# Patient Record
Sex: FEMALE | URBAN - METROPOLITAN AREA
[De-identification: names, ages, dates, MRNs, and addresses within clinical notes are randomized per-mention and may not be internally consistent; named-entity substitution may affect disease eponyms.]

---

## 2019-09-12 ENCOUNTER — APPOINTMENT (RX ONLY)
Dept: URBAN - METROPOLITAN AREA CLINIC 51 | Facility: CLINIC | Age: 49
Setting detail: DERMATOLOGY
End: 2019-09-12

## 2019-09-12 DIAGNOSIS — L21.8 OTHER SEBORRHEIC DERMATITIS: ICD-10-CM

## 2019-09-12 DIAGNOSIS — D22 MELANOCYTIC NEVI: ICD-10-CM

## 2019-09-12 DIAGNOSIS — H01.13 ECZEMATOUS DERMATITIS OF EYELID: ICD-10-CM

## 2019-09-12 DIAGNOSIS — L63.8 OTHER ALOPECIA AREATA: ICD-10-CM

## 2019-09-12 DIAGNOSIS — B35.3 TINEA PEDIS: ICD-10-CM

## 2019-09-12 PROBLEM — D22.9 MELANOCYTIC NEVI, UNSPECIFIED: Status: ACTIVE | Noted: 2019-09-12

## 2019-09-12 PROBLEM — H01.139 ECZEMATOUS DERMATITIS OF UNSPECIFIED EYE, UNSPECIFIED EYELID: Status: ACTIVE | Noted: 2019-09-12

## 2019-09-12 PROCEDURE — 99203 OFFICE O/P NEW LOW 30 MIN: CPT | Mod: 25

## 2019-09-12 PROCEDURE — ? INTRALESIONAL KENALOG

## 2019-09-12 PROCEDURE — ? TREATMENT REGIMEN

## 2019-09-12 PROCEDURE — ? PRESCRIPTION

## 2019-09-12 PROCEDURE — ? COUNSELING

## 2019-09-12 PROCEDURE — 11901 INJECT SKIN LESIONS >7: CPT

## 2019-09-12 RX ORDER — KETOCONAZOLE 20.5 MG/ML
SHAMPOO, SUSPENSION TOPICAL QD
Qty: 1 | Refills: 3 | Status: ERX

## 2019-09-12 RX ORDER — KETOCONAZOLE 20.5 MG/ML
SHAMPOO, SUSPENSION TOPICAL
Qty: 1 | Refills: 2 | Status: ERX | COMMUNITY
Start: 2019-09-12

## 2019-09-12 RX ORDER — KETOCONAZOLE 20 MG/G
CREAM TOPICAL BID
Qty: 1 | Refills: 3 | Status: ERX | COMMUNITY
Start: 2019-09-12

## 2019-09-12 RX ADMIN — KETOCONAZOLE: 20 CREAM TOPICAL at 00:00

## 2019-09-12 RX ADMIN — KETOCONAZOLE: 20.5 SHAMPOO, SUSPENSION TOPICAL at 00:00

## 2019-09-12 ASSESSMENT — LOCATION DETAILED DESCRIPTION DERM
LOCATION DETAILED: LEFT SUPERIOR PARIETAL SCALP
LOCATION DETAILED: RIGHT SUPERIOR PARIETAL SCALP
LOCATION DETAILED: LEFT MEDIAL FRONTAL SCALP
LOCATION DETAILED: POSTERIOR MID-PARIETAL SCALP
LOCATION DETAILED: LEFT CENTRAL PARIETAL SCALP

## 2019-09-12 ASSESSMENT — LOCATION ZONE DERM: LOCATION ZONE: SCALP

## 2019-09-12 ASSESSMENT — LOCATION SIMPLE DESCRIPTION DERM
LOCATION SIMPLE: POSTERIOR SCALP
LOCATION SIMPLE: SCALP
LOCATION SIMPLE: LEFT SCALP

## 2019-09-12 NOTE — PROCEDURE: INTRALESIONAL KENALOG
Kenalog Preparation: Kenalog
Administered By (Optional): Zoey Bray
Include Z78.9 (Other Specified Conditions Influencing Health Status) As An Associated Diagnosis?: No
Medical Necessity Clause: This procedure was medically necessary because the lesions that were treated were:
Total Volume Injected (Ccs- Only Use Numbers And Decimals): 1.0
Consent: The risks of atrophy were reviewed with the patient.
Detail Level: Detailed
X Size Of Lesion In Cm (Optional): 0
Concentration Of Solution Injected (Mg/Ml): 3.0

## 2019-09-12 NOTE — PROCEDURE: MIPS QUALITY
Quality 110: Preventive Care And Screening: Influenza Immunization: Influenza immunization was not ordered or administered, reason not given
Quality 431: Preventive Care And Screening: Unhealthy Alcohol Use - Screening: Unhealthy alcohol use screening not performed, reason not otherwise specified
Quality 226: Preventive Care And Screening: Tobacco Use: Screening And Cessation Intervention: Tobacco Screening not Performed for Medical Reasons
Detail Level: Detailed

## 2019-10-01 ENCOUNTER — APPOINTMENT (RX ONLY)
Dept: URBAN - METROPOLITAN AREA CLINIC 51 | Facility: CLINIC | Age: 49
Setting detail: DERMATOLOGY
End: 2019-10-01

## 2019-10-01 DIAGNOSIS — D22 MELANOCYTIC NEVI: ICD-10-CM

## 2019-10-01 DIAGNOSIS — L63.8 OTHER ALOPECIA AREATA: ICD-10-CM

## 2019-10-01 PROBLEM — D22.9 MELANOCYTIC NEVI, UNSPECIFIED: Status: ACTIVE | Noted: 2019-10-01

## 2019-10-01 PROCEDURE — 99213 OFFICE O/P EST LOW 20 MIN: CPT | Mod: 25

## 2019-10-01 PROCEDURE — ? INTRALESIONAL KENALOG

## 2019-10-01 PROCEDURE — ? PRESCRIPTION

## 2019-10-01 PROCEDURE — 11901 INJECT SKIN LESIONS >7: CPT

## 2019-10-01 PROCEDURE — ? COUNSELING

## 2019-10-01 RX ORDER — FLUOCINONIDE 0.5 MG/ML
SOLUTION TOPICAL BID
Qty: 1 | Refills: 0 | Status: ERX | COMMUNITY
Start: 2019-10-01

## 2019-10-01 RX ADMIN — FLUOCINONIDE: 0.5 SOLUTION TOPICAL at 18:07

## 2019-10-01 ASSESSMENT — LOCATION ZONE DERM
LOCATION ZONE: SCALP
LOCATION ZONE: FACE

## 2019-10-01 ASSESSMENT — LOCATION DETAILED DESCRIPTION DERM
LOCATION DETAILED: RIGHT SUPERIOR FOREHEAD
LOCATION DETAILED: LEFT SUPERIOR PARIETAL SCALP
LOCATION DETAILED: RIGHT SUPERIOR PARIETAL SCALP
LOCATION DETAILED: RIGHT CENTRAL PARIETAL SCALP
LOCATION DETAILED: RIGHT SUPERIOR OCCIPITAL SCALP
LOCATION DETAILED: LEFT CENTRAL FRONTAL SCALP
LOCATION DETAILED: LEFT CENTRAL PARIETAL SCALP
LOCATION DETAILED: RIGHT CENTRAL FRONTAL SCALP

## 2019-10-01 ASSESSMENT — LOCATION SIMPLE DESCRIPTION DERM
LOCATION SIMPLE: RIGHT FOREHEAD
LOCATION SIMPLE: SCALP
LOCATION SIMPLE: LEFT SCALP
LOCATION SIMPLE: RIGHT OCCIPITAL SCALP
LOCATION SIMPLE: RIGHT SCALP

## 2019-10-01 NOTE — PROCEDURE: INTRALESIONAL KENALOG
Medical Necessity Clause: This procedure was medically necessary because the lesions that were treated were:
Administered By (Optional): Thaddeus OLIVAREZ
Detail Level: Detailed
Concentration Of Solution Injected (Mg/Ml): 3.0
Consent: The risks of atrophy were reviewed with the patient.
Kenalog Preparation: Kenalog
Total Volume Injected (Ccs- Only Use Numbers And Decimals): 2.0
X Size Of Lesion In Cm (Optional): 0
Include Z78.9 (Other Specified Conditions Influencing Health Status) As An Associated Diagnosis?: No
Ndc# For Kenalog Only: 0705-6212-90

## 2019-10-15 ENCOUNTER — APPOINTMENT (RX ONLY)
Dept: URBAN - METROPOLITAN AREA CLINIC 51 | Facility: CLINIC | Age: 49
Setting detail: DERMATOLOGY
End: 2019-10-15

## 2019-10-15 DIAGNOSIS — L63.8 OTHER ALOPECIA AREATA: ICD-10-CM

## 2019-10-15 DIAGNOSIS — D22 MELANOCYTIC NEVI: ICD-10-CM

## 2019-10-15 PROBLEM — D22.9 MELANOCYTIC NEVI, UNSPECIFIED: Status: ACTIVE | Noted: 2019-10-15

## 2019-10-15 PROCEDURE — ? COUNSELING

## 2019-10-15 PROCEDURE — ? INTRALESIONAL KENALOG

## 2019-10-15 PROCEDURE — 99213 OFFICE O/P EST LOW 20 MIN: CPT | Mod: 25

## 2019-10-15 PROCEDURE — 11900 INJECT SKIN LESIONS </W 7: CPT

## 2019-10-15 ASSESSMENT — LOCATION SIMPLE DESCRIPTION DERM: LOCATION SIMPLE: LEFT FOREHEAD

## 2019-10-15 ASSESSMENT — LOCATION ZONE DERM: LOCATION ZONE: FACE

## 2019-10-15 ASSESSMENT — LOCATION DETAILED DESCRIPTION DERM: LOCATION DETAILED: LEFT SUPERIOR MEDIAL FOREHEAD

## 2019-10-15 NOTE — PROCEDURE: MIPS QUALITY
Quality 110: Preventive Care And Screening: Influenza Immunization: Influenza immunization was not ordered or administered, reason not given
Quality 431: Preventive Care And Screening: Unhealthy Alcohol Use - Screening: Unhealthy alcohol use screening not performed, reason not otherwise specified
Detail Level: Detailed
Quality 130: Documentation Of Current Medications In The Medical Record: Current Medications Documented
Quality 226: Preventive Care And Screening: Tobacco Use: Screening And Cessation Intervention: Tobacco Screening not Performed for Medical Reasons

## 2019-10-15 NOTE — PROCEDURE: INTRALESIONAL KENALOG
Kenalog Preparation: Kenalog
Include Z78.9 (Other Specified Conditions Influencing Health Status) As An Associated Diagnosis?: No
Detail Level: Detailed
Total Volume Injected (Ccs- Only Use Numbers And Decimals): 2.0
Concentration Of Solution Injected (Mg/Ml): 3.0
Consent: The risks of atrophy were reviewed with the patient.
Administered By (Optional): [facility] JUANIS
Medical Necessity Clause: This procedure was medically necessary because the lesions that were treated were:
X Size Of Lesion In Cm (Optional): 0
Ndc# For Kenalog Only: 7982-9968-55

## 2019-10-29 ENCOUNTER — APPOINTMENT (RX ONLY)
Dept: URBAN - METROPOLITAN AREA CLINIC 51 | Facility: CLINIC | Age: 49
Setting detail: DERMATOLOGY
End: 2019-10-29

## 2019-10-29 DIAGNOSIS — L63.8 OTHER ALOPECIA AREATA: ICD-10-CM

## 2019-10-29 DIAGNOSIS — D22 MELANOCYTIC NEVI: ICD-10-CM

## 2019-10-29 PROBLEM — D22.9 MELANOCYTIC NEVI, UNSPECIFIED: Status: ACTIVE | Noted: 2019-10-29

## 2019-10-29 PROCEDURE — ? PRESCRIPTION

## 2019-10-29 PROCEDURE — 11901 INJECT SKIN LESIONS >7: CPT

## 2019-10-29 PROCEDURE — 99213 OFFICE O/P EST LOW 20 MIN: CPT | Mod: 25

## 2019-10-29 PROCEDURE — ? INTRALESIONAL KENALOG

## 2019-10-29 PROCEDURE — ? COUNSELING

## 2019-10-29 RX ORDER — FLUOCINONIDE 0.5 MG/ML
SOLUTION TOPICAL BID
Qty: 1 | Refills: 3 | Status: ACTIVE

## 2019-10-29 ASSESSMENT — LOCATION DETAILED DESCRIPTION DERM
LOCATION DETAILED: LEFT SUPERIOR PARIETAL SCALP
LOCATION DETAILED: POSTERIOR MID-PARIETAL SCALP
LOCATION DETAILED: LEFT CENTRAL FRONTAL SCALP
LOCATION DETAILED: RIGHT CENTRAL PARIETAL SCALP
LOCATION DETAILED: LEFT SUPERIOR FOREHEAD
LOCATION DETAILED: LEFT MEDIAL FRONTAL SCALP
LOCATION DETAILED: RIGHT SUPERIOR PARIETAL SCALP
LOCATION DETAILED: RIGHT SUPERIOR FOREHEAD
LOCATION DETAILED: RIGHT MEDIAL FRONTAL SCALP

## 2019-10-29 ASSESSMENT — LOCATION SIMPLE DESCRIPTION DERM
LOCATION SIMPLE: LEFT FOREHEAD
LOCATION SIMPLE: POSTERIOR SCALP
LOCATION SIMPLE: RIGHT SCALP
LOCATION SIMPLE: SCALP
LOCATION SIMPLE: LEFT SCALP
LOCATION SIMPLE: RIGHT FOREHEAD

## 2019-10-29 ASSESSMENT — LOCATION ZONE DERM
LOCATION ZONE: FACE
LOCATION ZONE: SCALP

## 2019-10-29 NOTE — PROCEDURE: INTRALESIONAL KENALOG
Medical Necessity Clause: This procedure was medically necessary because the lesions that were treated were:
X Size Of Lesion In Cm (Optional): 0
Total Volume Injected (Ccs- Only Use Numbers And Decimals): 2.0
Include Z78.9 (Other Specified Conditions Influencing Health Status) As An Associated Diagnosis?: No
Ndc# For Kenalog Only: 2266-5540-47
Consent: The risks of atrophy were reviewed with the patient.
Kenalog Preparation: Kenalog
Concentration Of Solution Injected (Mg/Ml): 3.0
Detail Level: Detailed
Administered By (Optional): Thaddeus OLIVAREZ

## 2019-11-12 ENCOUNTER — APPOINTMENT (RX ONLY)
Dept: URBAN - METROPOLITAN AREA CLINIC 51 | Facility: CLINIC | Age: 49
Setting detail: DERMATOLOGY
End: 2019-11-12

## 2019-11-12 DIAGNOSIS — L63.8 OTHER ALOPECIA AREATA: ICD-10-CM

## 2019-11-12 DIAGNOSIS — L21.8 OTHER SEBORRHEIC DERMATITIS: ICD-10-CM

## 2019-11-12 PROCEDURE — ? COUNSELING

## 2019-11-12 PROCEDURE — 11901 INJECT SKIN LESIONS >7: CPT

## 2019-11-12 PROCEDURE — ? PRESCRIPTION

## 2019-11-12 PROCEDURE — 99213 OFFICE O/P EST LOW 20 MIN: CPT | Mod: 25

## 2019-11-12 PROCEDURE — ? INTRALESIONAL KENALOG

## 2019-11-12 RX ORDER — KETOCONAZOLE 20.5 MG/ML
1 SHAMPOO, SUSPENSION TOPICAL 3 TIMES WEEKLY
Qty: 1 | Refills: 3 | Status: ERX

## 2019-11-12 RX ORDER — FLUOCINONIDE 0.5 MG/ML
1 SOLUTION TOPICAL BID
Qty: 1 | Refills: 3 | Status: ERX

## 2019-11-12 ASSESSMENT — LOCATION DETAILED DESCRIPTION DERM
LOCATION DETAILED: LEFT SUPERIOR OCCIPITAL SCALP
LOCATION DETAILED: LEFT CENTRAL FRONTAL SCALP
LOCATION DETAILED: POSTERIOR MID-PARIETAL SCALP
LOCATION DETAILED: RIGHT CENTRAL FRONTAL SCALP
LOCATION DETAILED: RIGHT MEDIAL FRONTAL SCALP
LOCATION DETAILED: RIGHT SUPERIOR PARIETAL SCALP
LOCATION DETAILED: RIGHT SUPERIOR OCCIPITAL SCALP
LOCATION DETAILED: RIGHT CENTRAL PARIETAL SCALP

## 2019-11-12 ASSESSMENT — LOCATION SIMPLE DESCRIPTION DERM
LOCATION SIMPLE: RIGHT SCALP
LOCATION SIMPLE: RIGHT OCCIPITAL SCALP
LOCATION SIMPLE: SCALP
LOCATION SIMPLE: POSTERIOR SCALP
LOCATION SIMPLE: LEFT SCALP
LOCATION SIMPLE: LEFT OCCIPITAL SCALP

## 2019-11-12 ASSESSMENT — LOCATION ZONE DERM: LOCATION ZONE: SCALP

## 2019-11-12 NOTE — PROCEDURE: INTRALESIONAL KENALOG
Treatment Number (Optional): 8
Total Volume Injected (Ccs- Only Use Numbers And Decimals): 2.0
Concentration Of Solution Injected (Mg/Ml): 3.0
Administered By (Optional): Thaddeus OLIVAREZ
Include Z78.9 (Other Specified Conditions Influencing Health Status) As An Associated Diagnosis?: No
Detail Level: Detailed
Medical Necessity Clause: This procedure was medically necessary because the lesions that were treated were:
Consent: The risks of atrophy were reviewed with the patient.
Ndc# For Kenalog Only: [id number]
Kenalog Preparation: Kenalog
X Size Of Lesion In Cm (Optional): 0

## 2019-12-19 ENCOUNTER — APPOINTMENT (RX ONLY)
Dept: URBAN - METROPOLITAN AREA CLINIC 51 | Facility: CLINIC | Age: 49
Setting detail: DERMATOLOGY
End: 2019-12-19

## 2019-12-19 DIAGNOSIS — L63.8 OTHER ALOPECIA AREATA: ICD-10-CM

## 2019-12-19 DIAGNOSIS — D22 MELANOCYTIC NEVI: ICD-10-CM

## 2019-12-19 PROBLEM — D22.9 MELANOCYTIC NEVI, UNSPECIFIED: Status: ACTIVE | Noted: 2019-12-19

## 2019-12-19 PROCEDURE — ? INTRALESIONAL KENALOG

## 2019-12-19 PROCEDURE — 11901 INJECT SKIN LESIONS >7: CPT

## 2019-12-19 PROCEDURE — ? COUNSELING

## 2019-12-19 PROCEDURE — 99213 OFFICE O/P EST LOW 20 MIN: CPT | Mod: 25

## 2019-12-19 ASSESSMENT — LOCATION SIMPLE DESCRIPTION DERM
LOCATION SIMPLE: RIGHT SCALP
LOCATION SIMPLE: SCALP
LOCATION SIMPLE: RIGHT FOREHEAD
LOCATION SIMPLE: LEFT FOREHEAD
LOCATION SIMPLE: LEFT SCALP

## 2019-12-19 ASSESSMENT — LOCATION DETAILED DESCRIPTION DERM
LOCATION DETAILED: RIGHT SUPERIOR FOREHEAD
LOCATION DETAILED: LEFT SUPERIOR MEDIAL FOREHEAD
LOCATION DETAILED: LEFT SUPERIOR FOREHEAD
LOCATION DETAILED: LEFT MEDIAL FRONTAL SCALP
LOCATION DETAILED: RIGHT CENTRAL FRONTAL SCALP
LOCATION DETAILED: RIGHT SUPERIOR PARIETAL SCALP
LOCATION DETAILED: RIGHT CENTRAL PARIETAL SCALP
LOCATION DETAILED: LEFT CENTRAL PARIETAL SCALP
LOCATION DETAILED: LEFT SUPERIOR PARIETAL SCALP

## 2019-12-19 ASSESSMENT — LOCATION ZONE DERM
LOCATION ZONE: FACE
LOCATION ZONE: SCALP

## 2019-12-19 NOTE — PROCEDURE: INTRALESIONAL KENALOG
Detail Level: Detailed
Kenalog Preparation: Kenalog
Include Z78.9 (Other Specified Conditions Influencing Health Status) As An Associated Diagnosis?: No
Total Volume Injected (Ccs- Only Use Numbers And Decimals): 2.0
Administered By (Optional): Thaddeus OLIVAREZ
X Size Of Lesion In Cm (Optional): 0
Medical Necessity Clause: This procedure was medically necessary because the lesions that were treated were:
Ndc# For Kenalog Only: [id number]
Concentration Of Solution Injected (Mg/Ml): 3.0
Consent: The risks of atrophy were reviewed with the patient.

## 2019-12-27 ENCOUNTER — APPOINTMENT (RX ONLY)
Dept: URBAN - METROPOLITAN AREA CLINIC 51 | Facility: CLINIC | Age: 49
Setting detail: DERMATOLOGY
End: 2019-12-27

## 2019-12-27 DIAGNOSIS — Z41.9 ENCOUNTER FOR PROCEDURE FOR PURPOSES OTHER THAN REMEDYING HEALTH STATE, UNSPECIFIED: ICD-10-CM

## 2019-12-27 PROCEDURE — ? LASER HAIR REMOVAL

## 2019-12-27 NOTE — PROCEDURE: LASER HAIR REMOVAL
Cooling: DCD setting
Consent: Written consent obtained, risks reviewed including but not limited to crusting, scabbing, blistering, scarring, darker or lighter pigmentary change, paradoxical hair regrowth, incomplete removal of hair and infection.
Spot Size: 8 mm
Fluence (Will Not Render If 0): [address]
Cooling: chill tip
External Cooling Fan Speed: 0
Spot Size: 12 mm
Post-Care Instructions: I reviewed with the patient in detail post-care instructions. Patient should avoid sun for a minimum of 4 weeks before and after treatment.
Pulse Duration: auto
Number Of Prepaid Treatments (Will Not Render If 0): 8
Total Pulses: [address]
Treatment Number: 1
Total Pulses: [address] [address]
Render Post-Care In The Note: No
Spot Size: 10 mm
Pre-Procedure: Prior to proceeding the treatment areas were cleaned and all present put on their eye protection.
Fluence (Will Not Render If 0): [address] [facility] [place]
Detail Level: Detailed
Fluence (Will Not Render If 0): 15
Laser Type: Lumenis Splendor X
Anesthesia Type: 1% lidocaine with epinephrine
Topical Anesthesia Type: 9.6% lidocaine
Post-Procedure Care: Immediate endpoint: perifollicular erythema and edema. Hydrocortisone cream applied. Post care reviewed with patient.
Pulse Duration: 30 ms
Fluence (Will Not Render If 0): 21

## 2020-02-07 ENCOUNTER — APPOINTMENT (RX ONLY)
Dept: URBAN - METROPOLITAN AREA CLINIC 51 | Facility: CLINIC | Age: 50
Setting detail: DERMATOLOGY
End: 2020-02-07

## 2020-02-07 DIAGNOSIS — Z41.9 ENCOUNTER FOR PROCEDURE FOR PURPOSES OTHER THAN REMEDYING HEALTH STATE, UNSPECIFIED: ICD-10-CM

## 2020-02-07 PROCEDURE — ? LASER HAIR REMOVAL

## 2020-02-07 NOTE — PROCEDURE: LASER HAIR REMOVAL
Total Pulses: [address]
Spot Size: 12 mm
Number Of Prepaid Treatments (Will Not Render If 0): 8
Number Of Prepaid Treatments (Will Not Render If 0): 0
Consent: Written consent obtained, risks reviewed including but not limited to crusting, scabbing, blistering, scarring, darker or lighter pigmentary change, paradoxical hair regrowth, incomplete removal of hair and infection.
Pulse Duration: auto
Pre-Procedure: Prior to proceeding the treatment areas were cleaned and all present put on their eye protection.
Fluence (Will Not Render If 0): 15
Anesthesia Type: 1% lidocaine with epinephrine
Topical Anesthesia Type: 9.6% lidocaine
Pulse Duration: 30 ms
Post-Care Instructions: I reviewed with the patient in detail post-care instructions. Patient should avoid sun for a minimum of 4 weeks before and after treatment.
Cooling: chill tip
Fluence (Will Not Render If 0): 21
Spot Size: 10 mm
Laser Type: Lumenis Splendor X
Post-Procedure Care: Immediate endpoint: perifollicular erythema and edema. Hydrocortisone cream applied. Post care reviewed with patient.
Render Post-Care In The Note: No
Fluence (Will Not Render If 0): [address] [facility]
Detail Level: Detailed
Total Pulses: [address] [address]
Treatment Number: 2
Cooling: DCD setting
Spot Size: 8 mm
Fluence (Will Not Render If 0): [address]
Treatment Number: 1
Fluence (Will Not Render If 0): 26
Total Pulses: 18
Total Pulses (Settings #3): 548
Fluence (Will Not Render If 0): 20

## 2020-02-07 NOTE — HPI: COSMETIC (LASER HAIR REMOVAL)
Have You Had Laser Hair Removal Before?: has had previous treatment
When Was Your Last Laser Treatment?: 12/27/2019
Number Of Treatments: 1
Have You Had Laser Hair Removal Before?: has not had previous treatment

## 2020-03-20 ENCOUNTER — APPOINTMENT (RX ONLY)
Dept: URBAN - METROPOLITAN AREA CLINIC 51 | Facility: CLINIC | Age: 50
Setting detail: DERMATOLOGY
End: 2020-03-20

## 2020-03-20 DIAGNOSIS — Z41.9 ENCOUNTER FOR PROCEDURE FOR PURPOSES OTHER THAN REMEDYING HEALTH STATE, UNSPECIFIED: ICD-10-CM

## 2020-03-20 PROCEDURE — ? LASER HAIR REMOVAL

## 2020-03-20 NOTE — PROCEDURE: LASER HAIR REMOVAL
Number Of Prepaid Treatments (Will Not Render If 0): 0
Number Of Prepaid Treatments (Will Not Render If 0): 8
Render Post-Care In The Note: No
Spot Size: 12 mm
Fluence (Will Not Render If 0): [address] [facility]
Fluence (Will Not Render If 0): [address]
Pulse Duration: auto
Cooling: DCD setting
Spot Size: 8 mm
Anesthesia Type: 1% lidocaine with epinephrine
Pre-Procedure: Prior to proceeding the treatment areas were cleaned and all present put on their eye protection.
Post-Procedure Care: Immediate endpoint: perifollicular erythema and edema. Hydrocortisone cream applied. Post care reviewed with patient.
Fluence (Will Not Render If 0): 15
Laser Type: Lumenis Splendor X
Detail Level: Detailed
Spot Size: 10 mm
Fluence (Will Not Render If 0): 21
Total Pulses: [address]
Pulse Duration: 30 ms
Consent: Written consent obtained, risks reviewed including but not limited to crusting, scabbing, blistering, scarring, darker or lighter pigmentary change, paradoxical hair regrowth, incomplete removal of hair and infection.
Total Pulses: [place]
Cooling: chill tip
Post-Care Instructions: I reviewed with the patient in detail post-care instructions. Patient should avoid sun for a minimum of 4 weeks before and after treatment.
Topical Anesthesia Type: 9.6% lidocaine
Treatment Number: 3
Total Pulses (Settings #3): 548
Fluence (Will Not Render If 0): 26
Treatment Number: 2
Fluence (Will Not Render If 0): 20
Fluence (Will Not Render If 0): 6
Total Pulses: [address]t

## 2020-03-20 NOTE — HPI: COSMETIC (LASER HAIR REMOVAL)
Have You Had Laser Hair Removal Before?: has had previous treatment
When Was Your Last Laser Treatment?: 02/07/2020
Number Of Treatments: 2
Number Of Treatments: 1

## 2021-06-25 ENCOUNTER — APPOINTMENT (RX ONLY)
Dept: URBAN - METROPOLITAN AREA CLINIC 51 | Facility: CLINIC | Age: 51
Setting detail: DERMATOLOGY
End: 2021-06-25

## 2021-06-25 DIAGNOSIS — Z41.9 ENCOUNTER FOR PROCEDURE FOR PURPOSES OTHER THAN REMEDYING HEALTH STATE, UNSPECIFIED: ICD-10-CM

## 2021-06-25 PROCEDURE — ? LASER HAIR REMOVAL

## 2021-06-25 NOTE — PROCEDURE: LASER HAIR REMOVAL
Treatment Number: 0
Eye Shield Text: Given the treatment area eye shields were inserted prior to treatment.
Pulse Duration: auto
Anesthesia Type: 1% lidocaine with epinephrine
Pre-Procedure: Prior to proceeding the treatment areas were cleaned and all present put on their eye protection.
Pulse Duration: 30 ms
Consent: Written consent obtained, risks reviewed including but not limited to crusting, scabbing, blistering, scarring, darker or lighter pigmentary change, paradoxical hair regrowth, incomplete removal of hair and infection.
Total Pulses: [address]
Cooling: chill tip
Post-Procedure Care: Immediate endpoint: perifollicular erythema and edema. Hydrocortisone cream applied. Post care reviewed with patient.
Laser Type: Lumenis Splendor X
Spot Size: 10 mm
Post-Care Instructions: I reviewed with the patient in detail post-care instructions. Patient should avoid sun for a minimum of 4 weeks before and after treatment.
Treatment Number: 4
Fluence (Will Not Render If 0): [address] [facility]
Render Post-Care In The Note: No
Number Of Prepaid Treatments (Will Not Render If 0): 8
Cooling: DCD setting
Topical Anesthesia Type: 9.6% lidocaine
Spot Size: 12 mm
Total Pulses: [address]
Spot Size: 8 mm
Fluence (Will Not Render If 0): 21
Fluence (Will Not Render If 0): 15
Detail Level: Detailed
Fluence (Will Not Render If 0): [address]
Fluence (Will Not Render If 0): 20
Total Pulses: [address]
Fluence (Will Not Render If 0): 26
Total Pulses (Settings #3): 548
Treatment Number: 3
Fluence (Will Not Render If 0): 6

## 2021-06-25 NOTE — HPI: COSMETIC (LASER HAIR REMOVAL)
Have You Had Laser Hair Removal Before?: has had previous treatment
When Was Your Last Laser Treatment?: 05/20/2020
Number Of Treatments: 3
Number Of Treatments: 2

## 2021-07-27 ENCOUNTER — APPOINTMENT (RX ONLY)
Dept: URBAN - METROPOLITAN AREA CLINIC 51 | Facility: CLINIC | Age: 51
Setting detail: DERMATOLOGY
End: 2021-07-27

## 2021-07-27 DIAGNOSIS — Z41.9 ENCOUNTER FOR PROCEDURE FOR PURPOSES OTHER THAN REMEDYING HEALTH STATE, UNSPECIFIED: ICD-10-CM

## 2021-07-27 PROCEDURE — ? LASER HAIR REMOVAL

## 2021-07-27 NOTE — HPI: COSMETIC (LASER HAIR REMOVAL)
Have You Had Laser Hair Removal Before?: has had previous treatment
When Was Your Last Laser Treatment?: 06/25/2021
Number Of Treatments: 4
Number Of Treatments: 3

## 2021-07-27 NOTE — PROCEDURE: LASER HAIR REMOVAL
Detail Level: Detailed
Cooling: chill tip
Treatment Number: 5
Treatment Number: 0
Post-Care Instructions: I reviewed with the patient in detail post-care instructions. Patient should avoid sun for a minimum of 4 weeks before and after treatment.
Eye Shield Text: Given the treatment area eye shields were inserted prior to treatment.
Number Of Prepaid Treatments (Will Not Render If 0): 8
Fluence (Will Not Render If 0): [address] [facility]
Cooling: DCD setting
Render Post-Care In The Note: No
Spot Size: 8 mm
Pulse Duration: 30 ms
Fluence (Will Not Render If 0): 15
Spot Size: 10 mm
Topical Anesthesia Type: 9.6% lidocaine
Pulse Duration: auto
Total Pulses: [address] [facility]
Fluence (Will Not Render If 0): [address]
Spot Size: 12 mm
Pre-Procedure: Prior to proceeding the treatment areas were cleaned and all present put on their eye protection.
Anesthesia Type: 1% lidocaine with epinephrine
Fluence (Will Not Render If 0): 21
Total Pulses: [address]
Laser Type: Lumenis Splendor X
Consent: Written consent obtained, risks reviewed including but not limited to crusting, scabbing, blistering, scarring, darker or lighter pigmentary change, paradoxical hair regrowth, incomplete removal of hair and infection.
Post-Procedure Care: Immediate endpoint: perifollicular erythema and edema. Hydrocortisone cream applied. Post care reviewed with patient.
Fluence (Will Not Render If 0): 20
Total Pulses: [address]
Spot Size: 20 mm
Fluence (Will Not Render If 0): 26
Total Pulses (Settings #3): 548
Treatment Number: 4
Fluence (Will Not Render If 0): 6

## 2021-08-24 ENCOUNTER — APPOINTMENT (RX ONLY)
Dept: URBAN - METROPOLITAN AREA CLINIC 51 | Facility: CLINIC | Age: 51
Setting detail: DERMATOLOGY
End: 2021-08-24

## 2021-08-24 DIAGNOSIS — Z41.9 ENCOUNTER FOR PROCEDURE FOR PURPOSES OTHER THAN REMEDYING HEALTH STATE, UNSPECIFIED: ICD-10-CM

## 2021-08-24 PROCEDURE — ? LASER HAIR REMOVAL

## 2021-08-24 NOTE — HPI: COSMETIC (LASER HAIR REMOVAL)
Have You Had Laser Hair Removal Before?: has had previous treatment
When Was Your Last Laser Treatment?: 07/27/2021
Number Of Treatments: 5
Number Of Treatments: 4

## 2021-08-24 NOTE — PROCEDURE: LASER HAIR REMOVAL
Anesthesia Type: 1% lidocaine with epinephrine
Eye Shield Text: Given the treatment area eye shields were inserted prior to treatment.
Pre-Procedure: Prior to proceeding the treatment areas were cleaned and all present put on their eye protection.
Post-Procedure Care: Immediate endpoint: perifollicular erythema and edema. Hydrocortisone cream applied. Post care reviewed with patient.
Pulse Duration: 30 ms
Laser Type: Lumenis Splendor X
Spot Size: 10 mm
Fluence (Will Not Render If 0): 15
Treatment Number: 6
Pulse Duration: auto
Treatment Number: 0
Number Of Prepaid Treatments (Will Not Render If 0): 8
Spot Size: 12 mm
Total Pulses: [address] [facility]
Fluence (Will Not Render If 0): [address]
Topical Anesthesia Type: 9.6% lidocaine
Consent: Written consent obtained, risks reviewed including but not limited to crusting, scabbing, blistering, scarring, darker or lighter pigmentary change, paradoxical hair regrowth, incomplete removal of hair and infection.
Total Pulses: [address]
Fluence (Will Not Render If 0): 21
Cooling: chill tip
Post-Care Instructions: I reviewed with the patient in detail post-care instructions. Patient should avoid sun for a minimum of 4 weeks before and after treatment.
Were Eye Shields Employed?: No
Fluence (Will Not Render If 0): [address] [facility]
Cooling: DCD setting
Detail Level: Detailed
Spot Size: 8 mm
Fluence (Will Not Render If 0): 20
Total Pulses: [address] [facility]
Fluence (Will Not Render If 0): 26
Total Pulses (Settings #3): 548
Treatment Number: 5

## 2021-09-28 ENCOUNTER — APPOINTMENT (RX ONLY)
Dept: URBAN - METROPOLITAN AREA CLINIC 51 | Facility: CLINIC | Age: 51
Setting detail: DERMATOLOGY
End: 2021-09-28

## 2021-09-28 DIAGNOSIS — Z41.9 ENCOUNTER FOR PROCEDURE FOR PURPOSES OTHER THAN REMEDYING HEALTH STATE, UNSPECIFIED: ICD-10-CM

## 2021-09-28 PROCEDURE — ? LASER HAIR REMOVAL

## 2021-09-28 NOTE — HPI: COSMETIC (LASER HAIR REMOVAL)
Have You Had Laser Hair Removal Before?: has had previous treatment
When Was Your Last Laser Treatment?: 08/24/2021
Number Of Treatments: 6
Number Of Treatments: 5

## 2021-09-28 NOTE — PROCEDURE: LASER HAIR REMOVAL
Fluence (Will Not Render If 0): [address]
Spot Size: 12 mm
Number Of Prepaid Treatments (Will Not Render If 0): 0
Render Post-Care In The Note: No
Total Pulses: [address]
Number Of Prepaid Treatments (Will Not Render If 0): 8
Total Pulses: [address]
Pulse Duration: auto
Fluence (Will Not Render If 0): 21
Cooling: chill tip
Detail Level: Detailed
Eye Shield Text: Given the treatment area eye shields were inserted prior to treatment.
Cooling: DCD setting
Spot Size: 8 mm
Fluence (Will Not Render If 0): [address] [facility] [place]
Anesthesia Type: 1% lidocaine with epinephrine
Pre-Procedure: Prior to proceeding the treatment areas were cleaned and all present put on their eye protection.
Pulse Duration: 30 ms
Post-Procedure Care: Immediate endpoint: perifollicular erythema and edema. Hydrocortisone cream applied. Post care reviewed with patient.
Fluence (Will Not Render If 0): 15
Spot Size: 10 mm
Consent: Written consent obtained, risks reviewed including but not limited to crusting, scabbing, blistering, scarring, darker or lighter pigmentary change, paradoxical hair regrowth, incomplete removal of hair and infection.
Laser Type: Lumenis Splendor X
Post-Care Instructions: I reviewed with the patient in detail post-care instructions. Patient should avoid sun for a minimum of 4 weeks before and after treatment.
Treatment Number: 7
Topical Anesthesia Type: 9.6% lidocaine
Treatment Number: 6
Fluence (Will Not Render If 0): 26
Fluence (Will Not Render If 0): 20
Total Pulses: [place]
Total Pulses (Settings #3): 548

## 2021-10-26 ENCOUNTER — APPOINTMENT (RX ONLY)
Dept: URBAN - METROPOLITAN AREA CLINIC 51 | Facility: CLINIC | Age: 51
Setting detail: DERMATOLOGY
End: 2021-10-26

## 2021-10-26 DIAGNOSIS — Z41.9 ENCOUNTER FOR PROCEDURE FOR PURPOSES OTHER THAN REMEDYING HEALTH STATE, UNSPECIFIED: ICD-10-CM

## 2021-10-26 PROCEDURE — ? LASER HAIR REMOVAL

## 2021-10-26 NOTE — PROCEDURE: LASER HAIR REMOVAL
Treatment Number: 0
Pulse Duration: auto
Total Pulses: [address]
Fluence (Will Not Render If 0): 21
Consent: Written consent obtained, risks reviewed including but not limited to crusting, scabbing, blistering, scarring, darker or lighter pigmentary change, paradoxical hair regrowth, incomplete removal of hair and infection.
Cooling: chill tip
Topical Anesthesia Type: 9.6% lidocaine
Post-Care Instructions: I reviewed with the patient in detail post-care instructions. Patient should avoid sun for a minimum of 4 weeks before and after treatment.
Fluence (Will Not Render If 0): [address] [facility]
Anesthesia Type: 1% lidocaine with epinephrine
Render Post-Care In The Note: No
Cooling: DCD setting
Spot Size: 8 mm
Pre-Procedure: Prior to proceeding the treatment areas were cleaned and all present put on their eye protection.
Post-Procedure Care: Immediate endpoint: perifollicular erythema and edema. Hydrocortisone cream applied. Post care reviewed with patient.
Pulse Duration: 30 ms
Laser Type: Lumenis Splendor X
Eye Shield Text: Given the treatment area eye shields were inserted prior to treatment.
Fluence (Will Not Render If 0): 15
Spot Size: 10 mm
Detail Level: Detailed
Treatment Number: 8
Total Pulses: [address]
Spot Size: 12 mm
Fluence (Will Not Render If 0): [address]
Total Pulses: [address]
Fluence (Will Not Render If 0): 20
Fluence (Will Not Render If 0): 26
Total Pulses (Settings #3): 548
Treatment Number: 7
Fluence (Will Not Render If 0): 9

## 2021-10-26 NOTE — HPI: COSMETIC (LASER HAIR REMOVAL)
Have You Had Laser Hair Removal Before?: has had previous treatment
When Was Your Last Laser Treatment?: 09/28/2021
Number Of Treatments: 7
Number Of Treatments: 6